# Patient Record
Sex: MALE | URBAN - NONMETROPOLITAN AREA
[De-identification: names, ages, dates, MRNs, and addresses within clinical notes are randomized per-mention and may not be internally consistent; named-entity substitution may affect disease eponyms.]

---

## 2019-04-11 ENCOUNTER — NURSE TRIAGE (OUTPATIENT)
Dept: ADMINISTRATIVE | Age: 44
End: 2019-04-11

## 2019-04-12 NOTE — TELEPHONE ENCOUNTER
Summary: problems with a tooth    Having problems with a tooth- if he came in to be seen can they do anything?  Please advise.

## 2019-04-12 NOTE — TELEPHONE ENCOUNTER
Reason for Disposition   [1] SEVERE pain (e.g., excruciating, unable to do any normal activities) AND [2] not improved 2 hours after pain medicine    Answer Assessment - Initial Assessment Questions  1. LOCATION: \"Which tooth is hurting? \"  (e.g., right-side/left-side, upper/lower, front/back)      Upper right side, the very back tooth. The tooth is broken off.   2. ONSET: \"When did the toothache start? \"  (e.g., hours, days)        Having a lot of pain today. 3. SEVERITY: \"How bad is the toothache? \"  (Scale 1-10; mild, moderate or severe)    - MILD (1-3): doesn't interfere with chewing     - MODERATE (4-7): interferes with chewing, interferes with normal activities, awakens from sleep      - SEVERE (8-10): unable to eat, unable to do any normal activities, excruciating pain         Severe. 4. SWELLING: \"Is there any visible swelling of your face? \"      No  5. OTHER SYMPTOMS: \"Do you have any other symptoms? \" (e.g., fever)      No   6. PREGNANCY: \"Is there any chance you are pregnant? \" \"When was your last menstrual period? \"      Male    Protocols used: [id number]